# Patient Record
Sex: FEMALE | Race: ASIAN | NOT HISPANIC OR LATINO | URBAN - METROPOLITAN AREA
[De-identification: names, ages, dates, MRNs, and addresses within clinical notes are randomized per-mention and may not be internally consistent; named-entity substitution may affect disease eponyms.]

---

## 2021-02-08 ENCOUNTER — LAB OUTSIDE AN ENCOUNTER (OUTPATIENT)
Dept: URBAN - METROPOLITAN AREA CLINIC 115 | Facility: CLINIC | Age: 76
End: 2021-02-08

## 2021-02-08 ENCOUNTER — WEB ENCOUNTER (OUTPATIENT)
Dept: URBAN - METROPOLITAN AREA CLINIC 115 | Facility: CLINIC | Age: 76
End: 2021-02-08

## 2021-02-08 ENCOUNTER — OFFICE VISIT (OUTPATIENT)
Dept: URBAN - METROPOLITAN AREA CLINIC 115 | Facility: CLINIC | Age: 76
End: 2021-02-08
Payer: MEDICARE

## 2021-02-08 VITALS
BODY MASS INDEX: 20.08 KG/M2 | HEART RATE: 80 BPM | DIASTOLIC BLOOD PRESSURE: 78 MMHG | TEMPERATURE: 96.7 F | HEIGHT: 69 IN | WEIGHT: 135.6 LBS | SYSTOLIC BLOOD PRESSURE: 135 MMHG

## 2021-02-08 DIAGNOSIS — K57.10 DIVERTICULOSIS OF SMALL INTESTINE WITHOUT PERFORATION OR ABSCESS WITHOUT BLEEDING: ICD-10-CM

## 2021-02-08 DIAGNOSIS — A04.8 H. PYLORI INFECTION: ICD-10-CM

## 2021-02-08 DIAGNOSIS — D64.9 ANEMIA: ICD-10-CM

## 2021-02-08 DIAGNOSIS — K92.2 GI BLEEDING: ICD-10-CM

## 2021-02-08 PROCEDURE — G8427 DOCREV CUR MEDS BY ELIG CLIN: HCPCS | Performed by: INTERNAL MEDICINE

## 2021-02-08 PROCEDURE — 1036F TOBACCO NON-USER: CPT | Performed by: INTERNAL MEDICINE

## 2021-02-08 PROCEDURE — G8482 FLU IMMUNIZE ORDER/ADMIN: HCPCS | Performed by: INTERNAL MEDICINE

## 2021-02-08 PROCEDURE — G8417 CALC BMI ABV UP PARAM F/U: HCPCS | Performed by: INTERNAL MEDICINE

## 2021-02-08 PROCEDURE — 99214 OFFICE O/P EST MOD 30 MIN: CPT | Performed by: INTERNAL MEDICINE

## 2021-02-08 RX ORDER — GLIMEPIRIDE 2 MG/1
TABLET ORAL
Qty: 0 | Refills: 0 | Status: ACTIVE | COMMUNITY
Start: 2016-12-03

## 2021-02-08 RX ORDER — CALCIUM CARBONATE/VITAMIN D3 600 MG-10
TABLET ORAL
Qty: 0 | Refills: 0 | Status: ACTIVE | COMMUNITY
Start: 1900-01-01

## 2021-02-08 RX ORDER — MECLIZINE HYDROCHLORIDE 25 MG/1
TABLET ORAL
Qty: 0 | Refills: 0 | Status: ACTIVE | COMMUNITY
Start: 2016-05-17

## 2021-02-08 RX ORDER — IBUPROFEN 600 MG/1
TABLET ORAL
Qty: 0 | Refills: 0 | Status: DISCONTINUED | COMMUNITY
Start: 2017-01-23

## 2021-02-08 RX ORDER — LOSARTAN POTASSIUM 50 MG/1
TABLET ORAL
Qty: 0 | Refills: 0 | Status: ACTIVE | COMMUNITY
Start: 2016-12-03

## 2021-02-08 RX ORDER — ALENDRONATE SODIUM 70 MG/1
TABLET ORAL
Qty: 0 | Refills: 0 | Status: ACTIVE | COMMUNITY
Start: 2016-12-31

## 2021-02-08 RX ORDER — ASPIRIN 81 MG/1
TABLET, COATED ORAL
Qty: 0 | Refills: 0 | Status: DISCONTINUED | COMMUNITY
Start: 1900-01-01

## 2021-02-08 RX ORDER — LEVOTHYROXINE SODIUM 0.1 MG/1
TABLET ORAL
Qty: 0 | Refills: 0 | Status: ACTIVE | COMMUNITY
Start: 2016-12-14

## 2021-02-08 NOTE — HPI-TODAY'S VISIT:
TAKES IRON, BM 1-2 A DAY, TAKES MIRALAX, NO ABDOMINAL PAIN , NO BRIGHT RED BLOOD P/R..  EAT GOOD. NO WT LOSS.  LAST HGB 10.5 1 WEEK AGO.   FEELS TIRED, TAKES TWO IRON A DAY.   GOT ANOTHER IRON INFUSION

## 2021-02-22 ENCOUNTER — TELEPHONE ENCOUNTER (OUTPATIENT)
Dept: URBAN - METROPOLITAN AREA CLINIC 115 | Facility: CLINIC | Age: 76
End: 2021-02-22

## 2021-02-22 ENCOUNTER — OFFICE VISIT (OUTPATIENT)
Dept: URBAN - METROPOLITAN AREA CLINIC 114 | Facility: CLINIC | Age: 76
End: 2021-02-22
Payer: MEDICARE

## 2021-02-22 DIAGNOSIS — K92.2 ACUTE GASTROINTESTINAL BLEEDING: ICD-10-CM

## 2021-02-22 PROCEDURE — 91110 GI TRC IMG INTRAL ESOPH-ILE: CPT | Performed by: INTERNAL MEDICINE

## 2021-02-22 RX ORDER — MECLIZINE HYDROCHLORIDE 25 MG/1
TABLET ORAL
Qty: 0 | Refills: 0 | Status: ACTIVE | COMMUNITY
Start: 2016-05-17

## 2021-02-22 RX ORDER — LEVOTHYROXINE SODIUM 0.1 MG/1
TABLET ORAL
Qty: 0 | Refills: 0 | Status: ACTIVE | COMMUNITY
Start: 2016-12-14

## 2021-02-22 RX ORDER — GLIMEPIRIDE 2 MG/1
TABLET ORAL
Qty: 0 | Refills: 0 | Status: ACTIVE | COMMUNITY
Start: 2016-12-03

## 2021-02-22 RX ORDER — CALCIUM CARBONATE/VITAMIN D3 600 MG-10
TABLET ORAL
Qty: 0 | Refills: 0 | Status: ACTIVE | COMMUNITY
Start: 1900-01-01

## 2021-02-22 RX ORDER — LOSARTAN POTASSIUM 50 MG/1
TABLET ORAL
Qty: 0 | Refills: 0 | Status: ACTIVE | COMMUNITY
Start: 2016-12-03

## 2021-02-22 RX ORDER — ALENDRONATE SODIUM 70 MG/1
TABLET ORAL
Qty: 0 | Refills: 0 | Status: ACTIVE | COMMUNITY
Start: 2016-12-31

## 2021-02-26 ENCOUNTER — TELEPHONE ENCOUNTER (OUTPATIENT)
Dept: URBAN - METROPOLITAN AREA CLINIC 82 | Facility: CLINIC | Age: 76
End: 2021-02-26

## 2021-09-23 ENCOUNTER — TELEPHONE ENCOUNTER (OUTPATIENT)
Dept: URBAN - METROPOLITAN AREA CLINIC 92 | Facility: CLINIC | Age: 76
End: 2021-09-23

## 2021-09-26 ENCOUNTER — OUT OF OFFICE VISIT (OUTPATIENT)
Dept: URBAN - METROPOLITAN AREA MEDICAL CENTER 24 | Facility: MEDICAL CENTER | Age: 76
End: 2021-09-26
Payer: MEDICARE

## 2021-09-26 DIAGNOSIS — K80.50 BILE DUCT CALCULUS: ICD-10-CM

## 2021-09-26 DIAGNOSIS — R10.10 ABDOMINAL WALL PAIN IN RIGHT UPPER QUADRANT: ICD-10-CM

## 2021-09-26 PROCEDURE — 99223 1ST HOSP IP/OBS HIGH 75: CPT | Performed by: INTERNAL MEDICINE

## 2021-09-26 PROCEDURE — G8427 DOCREV CUR MEDS BY ELIG CLIN: HCPCS | Performed by: INTERNAL MEDICINE

## 2021-09-27 ENCOUNTER — OUT OF OFFICE VISIT (OUTPATIENT)
Dept: URBAN - METROPOLITAN AREA MEDICAL CENTER 24 | Facility: MEDICAL CENTER | Age: 76
End: 2021-09-27
Payer: MEDICARE

## 2021-09-27 DIAGNOSIS — K80.50 BILE DUCT CALCULUS: ICD-10-CM

## 2021-09-27 PROCEDURE — 43262 ENDO CHOLANGIOPANCREATOGRAPH: CPT | Performed by: INTERNAL MEDICINE

## 2021-09-27 PROCEDURE — 43264 ERCP REMOVE DUCT CALCULI: CPT | Performed by: INTERNAL MEDICINE

## 2021-09-28 ENCOUNTER — OUT OF OFFICE VISIT (OUTPATIENT)
Dept: URBAN - METROPOLITAN AREA MEDICAL CENTER 24 | Facility: MEDICAL CENTER | Age: 76
End: 2021-09-28
Payer: MEDICARE

## 2021-09-28 DIAGNOSIS — K80.50 BILE DUCT CALCULUS: ICD-10-CM

## 2021-09-28 PROCEDURE — 99232 SBSQ HOSP IP/OBS MODERATE 35: CPT | Performed by: INTERNAL MEDICINE

## 2021-10-27 ENCOUNTER — OFFICE VISIT (OUTPATIENT)
Dept: URBAN - METROPOLITAN AREA CLINIC 115 | Facility: CLINIC | Age: 76
End: 2021-10-27
Payer: MEDICARE

## 2021-10-27 ENCOUNTER — DASHBOARD ENCOUNTERS (OUTPATIENT)
Age: 76
End: 2021-10-27

## 2021-10-27 VITALS
DIASTOLIC BLOOD PRESSURE: 72 MMHG | HEART RATE: 89 BPM | WEIGHT: 127.2 LBS | SYSTOLIC BLOOD PRESSURE: 133 MMHG | BODY MASS INDEX: 18.21 KG/M2 | TEMPERATURE: 97.4 F | HEIGHT: 70 IN

## 2021-10-27 DIAGNOSIS — K59.01 SLOW TRANSIT CONSTIPATION: ICD-10-CM

## 2021-10-27 DIAGNOSIS — K57.10 DUODENAL DIVERTICULUM: ICD-10-CM

## 2021-10-27 DIAGNOSIS — R94.5 ABNORMAL LFTS: ICD-10-CM

## 2021-10-27 DIAGNOSIS — A04.8 H. PYLORI INFECTION: ICD-10-CM

## 2021-10-27 DIAGNOSIS — K92.2 GI BLEEDING: ICD-10-CM

## 2021-10-27 PROBLEM — 166603001: Status: ACTIVE | Noted: 2021-10-27

## 2021-10-27 PROBLEM — 35298007: Status: ACTIVE | Noted: 2021-10-27

## 2021-10-27 PROBLEM — 762275008: Status: ACTIVE | Noted: 2021-10-27

## 2021-10-27 PROCEDURE — 99213 OFFICE O/P EST LOW 20 MIN: CPT | Performed by: INTERNAL MEDICINE

## 2021-10-27 RX ORDER — MECLIZINE HYDROCHLORIDE 25 MG/1
TABLET ORAL
Qty: 0 | Refills: 0 | Status: ACTIVE | COMMUNITY
Start: 2016-05-17

## 2021-10-27 RX ORDER — ALENDRONATE SODIUM 70 MG/1
TABLET ORAL
Qty: 0 | Refills: 0 | Status: ACTIVE | COMMUNITY
Start: 2016-12-31

## 2021-10-27 RX ORDER — LOSARTAN POTASSIUM 50 MG/1
TABLET ORAL
Qty: 0 | Refills: 0 | Status: ACTIVE | COMMUNITY
Start: 2016-12-03

## 2021-10-27 RX ORDER — GLIMEPIRIDE 2 MG/1
TABLET ORAL
Qty: 0 | Refills: 0 | Status: ACTIVE | COMMUNITY
Start: 2016-12-03

## 2021-10-27 RX ORDER — LEVOTHYROXINE SODIUM 0.1 MG/1
TABLET ORAL
Qty: 0 | Refills: 0 | Status: ACTIVE | COMMUNITY
Start: 2016-12-14

## 2021-10-27 RX ORDER — CALCIUM CARBONATE/VITAMIN D3 600 MG-10
TABLET ORAL
Qty: 0 | Refills: 0 | Status: ACTIVE | COMMUNITY
Start: 1900-01-01

## 2021-10-27 NOTE — HPI-OTHER HISTORIES
No Influenza  Patient was in the hospital her hemoglobin on 9/28/2021 was 10.4 platelet count was normal her liver enzymes were abnormal on 9/28/2021 alk phos was 301 AST and ALT were 43 and 103 before that it was 1 higher than that.  Bilirubin was normal her lipase level was normal Covid was negative.  Patient had cholecystectomy showing mild cholecystitis.  Patient had a ERCP with Dr. Canchola eliminated x-ray showed dilatation of intra and extrahepatic bile duct patient underwent a balloon sweep of the common bile duct prior cholangiogram did show choledocholithiasis.  Patient was seen by surgery on 9/25/2021 for choledocholithiasis.  Patient was admitted with abdominal pain nausea vomiting.  Patient had abnormal liver enzymes but normal bilirubin on admission and ultrasound was showing gallstones.  INR was normal.  Patient was seen by Dr. Elliott.  For surgery initially patient had possible choledocholithiasis and elevated LFTs outpatient was showing ultrasound with common bile duct stone of 0.6 cm and thickened gallbladder.  Ultrasound done on the hospital on 9/25/2021 it was showing a choledocholithiasis with the mild duct dilatation and a contracted gallbladder.  I do not see the ERCP results.

## 2021-10-27 NOTE — HPI-TODAY'S VISIT:
STOMACH OKAY, APPETITE LOW  EAT AND FEELS FULL, NO PAIN , BM MIRALAX, NO BLEEDING, NO BLACK STOOL   LIPASE WITH DR STEVENSON WHEATLEY ONCOLOGIST, WAS NORMAL. . HEMGLOBIN 11, AND LFT NORMAL, EXCEPT AP , MUCH BETTER AFTER DISCHARGE.

## 2021-12-31 ENCOUNTER — OUT OF OFFICE VISIT (OUTPATIENT)
Dept: URBAN - METROPOLITAN AREA MEDICAL CENTER 24 | Facility: MEDICAL CENTER | Age: 76
End: 2021-12-31
Payer: MEDICARE

## 2021-12-31 DIAGNOSIS — K92.1 ACUTE MELENA: ICD-10-CM

## 2021-12-31 DIAGNOSIS — D62 ABLA (ACUTE BLOOD LOSS ANEMIA): ICD-10-CM

## 2021-12-31 DIAGNOSIS — K92.2 ACUTE GASTROINTESTINAL BLEEDING: ICD-10-CM

## 2021-12-31 PROCEDURE — 44361 SMALL BOWEL ENDOSCOPY/BIOPSY: CPT | Performed by: INTERNAL MEDICINE

## 2021-12-31 PROCEDURE — 44366 SMALL BOWEL ENDOSCOPY: CPT | Performed by: INTERNAL MEDICINE

## 2021-12-31 PROCEDURE — 99232 SBSQ HOSP IP/OBS MODERATE 35: CPT | Performed by: INTERNAL MEDICINE

## 2021-12-31 PROCEDURE — G8427 DOCREV CUR MEDS BY ELIG CLIN: HCPCS | Performed by: INTERNAL MEDICINE

## 2021-12-31 PROCEDURE — 99223 1ST HOSP IP/OBS HIGH 75: CPT | Performed by: INTERNAL MEDICINE

## 2022-01-03 ENCOUNTER — TELEPHONE ENCOUNTER (OUTPATIENT)
Dept: URBAN - METROPOLITAN AREA CLINIC 92 | Facility: CLINIC | Age: 77
End: 2022-01-03

## 2022-01-03 ENCOUNTER — OUT OF OFFICE VISIT (OUTPATIENT)
Dept: URBAN - METROPOLITAN AREA MEDICAL CENTER 24 | Facility: MEDICAL CENTER | Age: 77
End: 2022-01-03
Payer: MEDICARE

## 2022-01-03 DIAGNOSIS — D62 ABLA (ACUTE BLOOD LOSS ANEMIA): ICD-10-CM

## 2022-01-03 DIAGNOSIS — K92.1 ACUTE MELENA: ICD-10-CM

## 2022-01-03 DIAGNOSIS — U07.1 2019 NOVEL CORONAVIRUS DETECTED: ICD-10-CM

## 2022-01-03 PROCEDURE — 99232 SBSQ HOSP IP/OBS MODERATE 35: CPT | Performed by: INTERNAL MEDICINE

## 2022-01-06 PROBLEM — 74474003 GI BLEEDING: Status: ACTIVE | Noted: 2021-02-08

## 2022-01-07 ENCOUNTER — TELEPHONE ENCOUNTER (OUTPATIENT)
Dept: URBAN - METROPOLITAN AREA CLINIC 92 | Facility: CLINIC | Age: 77
End: 2022-01-07

## 2022-01-13 ENCOUNTER — TELEPHONE ENCOUNTER (OUTPATIENT)
Dept: URBAN - METROPOLITAN AREA CLINIC 92 | Facility: CLINIC | Age: 77
End: 2022-01-13

## 2022-01-13 ENCOUNTER — OFFICE VISIT (OUTPATIENT)
Dept: URBAN - METROPOLITAN AREA CLINIC 114 | Facility: CLINIC | Age: 77
End: 2022-01-13
Payer: MEDICARE

## 2022-01-13 DIAGNOSIS — K92.2 ACUTE GASTROINTESTINAL BLEEDING: ICD-10-CM

## 2022-01-13 PROCEDURE — 91110 GI TRC IMG INTRAL ESOPH-ILE: CPT | Performed by: INTERNAL MEDICINE

## 2022-01-25 ENCOUNTER — TELEPHONE ENCOUNTER (OUTPATIENT)
Dept: URBAN - METROPOLITAN AREA CLINIC 115 | Facility: CLINIC | Age: 77
End: 2022-01-25

## 2022-01-27 ENCOUNTER — TELEPHONE ENCOUNTER (OUTPATIENT)
Dept: URBAN - METROPOLITAN AREA CLINIC 92 | Facility: CLINIC | Age: 77
End: 2022-01-27

## 2022-01-27 PROBLEM — 87522002 IRON DEFICIENCY ANEMIA: Status: ACTIVE | Noted: 2022-01-27

## 2022-02-11 ENCOUNTER — OFFICE VISIT (OUTPATIENT)
Dept: URBAN - METROPOLITAN AREA MEDICAL CENTER 28 | Facility: MEDICAL CENTER | Age: 77
End: 2022-02-11
Payer: MEDICARE

## 2022-02-11 DIAGNOSIS — K92.2 ACUTE GASTROINTESTINAL BLEEDING: ICD-10-CM

## 2022-02-11 PROCEDURE — 44360 SMALL BOWEL ENDOSCOPY: CPT | Performed by: INTERNAL MEDICINE

## 2023-04-04 NOTE — PHYSICAL EXAM HENT:
Head,  normocephalic,  atraumatic,  Face,  Face within normal limits,  Ears,  External ears within normal limits,  Nose/Nasopharynx,  External nose  normal appearance,  nares patent,  no nasal discharge,  Mouth and Throat,  Oral cavity appearance normal,  Breath odor normal,  Lips,  Appearance normal Rifampin Pregnancy And Lactation Text: This medication is Pregnancy Category C and it isn't know if it is safe during pregnancy. It is also excreted in breast milk and should not be used if you are breast feeding.

## 2024-06-13 ENCOUNTER — OFFICE VISIT (OUTPATIENT)
Dept: URBAN - METROPOLITAN AREA CLINIC 115 | Facility: CLINIC | Age: 79
End: 2024-06-13

## 2024-06-13 NOTE — HPI-TODAY'S VISIT:
79 yo F with PMH of  pt of Dr. Cartwright presents with c/o abdominal pain. Denies n/v, diarrhea, constipation, blood in stool/vomit, dysphagia, odynophagia, unintentional weight loss, change in appetite, early satiety. Denies NSAID use, EtOH/tobacco/marijuana use. Last colonoscopy: 2019 with Dr. Griffiths showed hemorrhoids, moderately tortuous sigmoid/transverse, one imflammatory polyp Had ERCP in 9/2021 with sludge in CBD s/p sphincterotopy and extraction, 2 large periampullary divertiula ; 12/2021 enteroscopy showed bleeding area on gastric body s/p epi and cautery, mult duodenal/jejunal ulcers (neg bx), mult small bowel diverticula. 2/2022 enteroscopy showed mult diverticula in duodenum to mid jejunum

## 2024-11-20 ENCOUNTER — LAB OUTSIDE AN ENCOUNTER (OUTPATIENT)
Dept: URBAN - METROPOLITAN AREA CLINIC 115 | Facility: CLINIC | Age: 79
End: 2024-11-20

## 2024-11-20 ENCOUNTER — OFFICE VISIT (OUTPATIENT)
Dept: URBAN - METROPOLITAN AREA CLINIC 115 | Facility: CLINIC | Age: 79
End: 2024-11-20
Payer: MEDICARE

## 2024-11-20 VITALS
WEIGHT: 119.6 LBS | TEMPERATURE: 97.7 F | SYSTOLIC BLOOD PRESSURE: 143 MMHG | HEIGHT: 70 IN | DIASTOLIC BLOOD PRESSURE: 94 MMHG | HEART RATE: 76 BPM | BODY MASS INDEX: 17.12 KG/M2

## 2024-11-20 DIAGNOSIS — A04.8 H. PYLORI INFECTION: ICD-10-CM

## 2024-11-20 DIAGNOSIS — D50.9 IRON DEFICIENCY ANEMIA: ICD-10-CM

## 2024-11-20 DIAGNOSIS — K92.2 GI BLEEDING: ICD-10-CM

## 2024-11-20 DIAGNOSIS — R94.5 ABNORMAL LFTS: ICD-10-CM

## 2024-11-20 DIAGNOSIS — R68.81 EARLY SATIETY: ICD-10-CM

## 2024-11-20 DIAGNOSIS — R10.12 LUQ PAIN: ICD-10-CM

## 2024-11-20 DIAGNOSIS — R14.0 ABDOMINAL BLOATING: ICD-10-CM

## 2024-11-20 DIAGNOSIS — K57.10 DUODENAL DIVERTICULUM: ICD-10-CM

## 2024-11-20 DIAGNOSIS — R63.4 WEIGHT LOSS: ICD-10-CM

## 2024-11-20 DIAGNOSIS — K92.1 MELENA: ICD-10-CM

## 2024-11-20 DIAGNOSIS — K59.01 SLOW TRANSIT CONSTIPATION: ICD-10-CM

## 2024-11-20 PROBLEM — 301715003: Status: ACTIVE | Noted: 2024-11-20

## 2024-11-20 PROBLEM — 89362005: Status: ACTIVE | Noted: 2024-11-20

## 2024-11-20 PROBLEM — 442076002: Status: ACTIVE | Noted: 2024-11-20

## 2024-11-20 PROBLEM — 116289008: Status: ACTIVE | Noted: 2024-11-20

## 2024-11-20 PROCEDURE — 99214 OFFICE O/P EST MOD 30 MIN: CPT | Performed by: INTERNAL MEDICINE

## 2024-11-20 RX ORDER — DICLOFENAC SODIUM 10 MG/G
GEL TOPICAL
Qty: 100 GRAM | Status: ACTIVE | COMMUNITY

## 2024-11-20 RX ORDER — GLIMEPIRIDE 2 MG/1
TABLET ORAL
Qty: 0 | Refills: 0 | Status: DISCONTINUED | COMMUNITY
Start: 2016-12-03

## 2024-11-20 RX ORDER — ALENDRONATE SODIUM 70 MG/1
TABLET ORAL
Qty: 0 | Refills: 0 | Status: ACTIVE | COMMUNITY
Start: 2016-12-31

## 2024-11-20 RX ORDER — MECLIZINE HYDROCHLORIDE 25 MG/1
TABLET ORAL
Qty: 0 | Refills: 0 | Status: ACTIVE | COMMUNITY
Start: 2016-05-17

## 2024-11-20 RX ORDER — LEVOTHYROXINE SODIUM 0.1 MG/1
TABLET ORAL
Qty: 0 | Refills: 0 | Status: ACTIVE | COMMUNITY
Start: 2016-12-14

## 2024-11-20 RX ORDER — ATENOLOL 25 MG/1
TABLET ORAL
Qty: 90 TABLET | Status: ACTIVE | COMMUNITY

## 2024-11-20 RX ORDER — FAMOTIDINE 20 MG/1
TAKE 1 TABLET TABLET, FILM COATED ORAL TWICE A DAY
Qty: 180 | Refills: 3 | OUTPATIENT
Start: 2024-11-20

## 2024-11-20 RX ORDER — CALCIUM CARBONATE/VITAMIN D3 600 MG-10
TABLET ORAL
Qty: 0 | Refills: 0 | Status: ACTIVE | COMMUNITY
Start: 1900-01-01

## 2024-11-20 RX ORDER — LEVOTHYROXINE SODIUM 88 UG/1
TABLET ORAL
Qty: 90 TABLET | Status: ACTIVE | COMMUNITY

## 2024-11-20 RX ORDER — FAMOTIDINE 20 MG/1
TABLET, FILM COATED ORAL
Qty: 30 TABLET | Status: ACTIVE | COMMUNITY

## 2024-11-20 RX ORDER — GLIMEPIRIDE 1 MG/1
TABLET ORAL
Qty: 90 TABLET | Status: ACTIVE | COMMUNITY

## 2024-11-20 RX ORDER — LOSARTAN POTASSIUM 50 MG/1
TABLET ORAL
Qty: 0 | Refills: 0 | Status: ACTIVE | COMMUNITY
Start: 2016-12-03

## 2024-11-20 NOTE — HPI-TODAY'S VISIT:
MORE OFTEN STOMACH ACHE, WITH EPISODE OF VOMITING , FEEL BETTER AFTER VOMITING, LOT OF BLOATING, AND FEELS FULL   LOT OF CONSTIPATIN AND WT LOSS  LOST 10 LBS IN ONE EAR  DONT FEEL LIKE EAT, BLOATED AND GASSY.   VOMITING WATER  NO BLOOD, NO BLEEDING P/R,   NO NSIAD,  RIGHT KNEE REPLACEMENT, JULY 23,   RECENT LABS, SEPT HGB 11.3, ELEVATED ALKALINE PHOS.  DAUGHTER AUSTIN IS HERE.

## 2024-12-02 ENCOUNTER — LAB OUTSIDE AN ENCOUNTER (OUTPATIENT)
Dept: URBAN - METROPOLITAN AREA CLINIC 115 | Facility: CLINIC | Age: 79
End: 2024-12-02

## 2024-12-02 ENCOUNTER — TELEPHONE ENCOUNTER (OUTPATIENT)
Dept: URBAN - METROPOLITAN AREA CLINIC 115 | Facility: CLINIC | Age: 79
End: 2024-12-02